# Patient Record
Sex: MALE | Race: WHITE | URBAN - METROPOLITAN AREA
[De-identification: names, ages, dates, MRNs, and addresses within clinical notes are randomized per-mention and may not be internally consistent; named-entity substitution may affect disease eponyms.]

---

## 2018-08-15 ENCOUNTER — OFFICE VISIT (OUTPATIENT)
Dept: FAMILY MEDICINE | Facility: CLINIC | Age: 38
End: 2018-08-15
Payer: COMMERCIAL

## 2018-08-15 VITALS
WEIGHT: 193.8 LBS | DIASTOLIC BLOOD PRESSURE: 84 MMHG | SYSTOLIC BLOOD PRESSURE: 132 MMHG | BODY MASS INDEX: 27.13 KG/M2 | RESPIRATION RATE: 16 BRPM | HEART RATE: 74 BPM | TEMPERATURE: 98.6 F | HEIGHT: 71 IN

## 2018-08-15 DIAGNOSIS — H61.21 IMPACTED CERUMEN OF RIGHT EAR: Primary | ICD-10-CM

## 2018-08-15 PROCEDURE — 69209 REMOVE IMPACTED EAR WAX UNI: CPT | Mod: RT | Performed by: FAMILY MEDICINE

## 2018-08-15 ASSESSMENT — ANXIETY QUESTIONNAIRES
7. FEELING AFRAID AS IF SOMETHING AWFUL MIGHT HAPPEN: NOT AT ALL
6. BECOMING EASILY ANNOYED OR IRRITABLE: NOT AT ALL
GAD7 TOTAL SCORE: 0
5. BEING SO RESTLESS THAT IT IS HARD TO SIT STILL: NOT AT ALL
1. FEELING NERVOUS, ANXIOUS, OR ON EDGE: NOT AT ALL
2. NOT BEING ABLE TO STOP OR CONTROL WORRYING: NOT AT ALL
3. WORRYING TOO MUCH ABOUT DIFFERENT THINGS: NOT AT ALL
IF YOU CHECKED OFF ANY PROBLEMS ON THIS QUESTIONNAIRE, HOW DIFFICULT HAVE THESE PROBLEMS MADE IT FOR YOU TO DO YOUR WORK, TAKE CARE OF THINGS AT HOME, OR GET ALONG WITH OTHER PEOPLE: NOT DIFFICULT AT ALL

## 2018-08-15 ASSESSMENT — PATIENT HEALTH QUESTIONNAIRE - PHQ9: 5. POOR APPETITE OR OVEREATING: NOT AT ALL

## 2018-08-15 NOTE — MR AVS SNAPSHOT
"              After Visit Summary   8/15/2018    Joel Whitifeld    MRN: [id number]           Patient Information     Date Of Birth          1980        Visit Information        Provider Department      8/15/2018 1:20 PM Page Reynoso MD [facility]        Today's Diagnoses     Impacted cerumen of right ear    -  1       Follow-ups after your visit        Who to contact     If you have questions or need follow up information about today's clinic visit or your schedule please contact [facility] directly at [phone].  Normal or non-critical lab and imaging results will be communicated to you by MyChart, letter or phone within 4 business days after the clinic has received the results. If you do not hear from us within 7 days, please contact the clinic through MyChart or phone. If you have a critical or abnormal lab result, we will notify you by phone as soon as possible.  Submit refill requests through [employer] or call your pharmacy and they will forward the refill request to us. Please allow 3 business days for your refill to be completed.          Additional Information About Your Visit        Care EveryWhere ID     This is your Care EveryWhere ID. This could be used by other organizations to access your [facility] medical records  [id number]        Your Vitals Were     Pulse Temperature Respirations Height BMI (Body Mass Index)       74 98.6  F (37  C) (Tympanic) 16 5' 11.25\" (1.81 m) 26.84 kg/m2        Blood Pressure from Last 3 Encounters:   08/15/18 132/84   07/31/08 114/76   04/10/08 124/68    Weight from Last 3 Encounters:   08/15/18 193 lb 12.8 oz (87.9 kg)   07/31/08 188 lb (85.3 kg)   01/24/08 187 lb (84.8 kg)              Today, you had the following     No orders found for display       Primary Care Provider Office Phone # Fax #    Nilesh Finney [phone] [address] [facility] [address]        Equal Access to Services     " YULISSA ABAD : Hadii aad laine anthony Estrada, wadadada luqadaha, qaybta kaalmada josebuckbrian, waxamy mj videswillirebecca johns. So [facility] [phone].    ATENCIÓN: Si habla español, tiene a pacheco disposición servicios gratuitos de asistencia lingüística. Llame al [phone].    We comply with applicable federal civil rights laws and [place] laws. We do not discriminate on the basis of race, color, national origin, age, disability, sex, sexual orientation, or gender identity.            Thank you!     Thank you for choosing [facility]  for your care. Our goal is always to provide you with excellent care. Hearing back from our patients is one way we can continue to improve our services. Please take a few minutes to complete the written survey that you may receive in the mail after your visit with us. Thank you!             Your Updated Medication List - Protect others around you: Learn how to safely use, store and throw away your medicines at www.disposemymeds.org.          This list is accurate as of 8/15/18  1:52 PM.  Always use your most recent med list.                   Brand Name Dispense Instructions for use Diagnosis    NO ACTIVE MEDICATIONS      .    Unspecified sinusitis (chronic)

## 2018-08-15 NOTE — PROGRESS NOTES
"   SUBJECTIVE:   Joel Whitfield is a 38 year old male who presents to clinic today for the following health issues:    Chief Complaint   Patient presents with     New Patient     Plugged Ears     Right ear. No pain. Patient went swimming in a pool 5 days ago.     SUBJECTIVE:  Joel Whitfield, a 38 year old male scheduled an appointment to discuss the following issues:  Impacted cerumen of right ear    4 days of plugged right ear without pain.  Started after swimming.     Medical, social, surgical, and family histories reviewed.  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)    Social History   Substance Use Topics     Smoking status: Never Smoker     Smokeless tobacco: Current User     Types: Chew     Alcohol use Yes         OBJECTIVE:  /84  Pulse 74  Temp 98.6  F (37  C) (Tympanic)  Resp 16  Ht 5' 11.25\" (1.81 m)  Wt 193 lb 12.8 oz (87.9 kg)  BMI 26.84 kg/m2  EXAM:  GENERAL APPEARANCE ADULT: Alert, no acute distress  HENT: right TM not visualized secondary to cerumen, left TM normal, ear canal: right ear:cerumen removed by irrigation, canal shows mild erythema after removal of wax    ASSESSMENT/PLAN:    (H61.21) Impacted cerumen of right ear  (primary encounter diagnosis)  Comment: cerumen removed by flushing  Plan: f/u prn. Canal is mildly erythematous, likely just irritated from the cerumen and flushing.  If painful over the next few days, can call and I'll order some drops.                  "

## 2018-08-16 ASSESSMENT — ANXIETY QUESTIONNAIRES: GAD7 TOTAL SCORE: 0

## 2018-08-16 ASSESSMENT — PATIENT HEALTH QUESTIONNAIRE - PHQ9: SUM OF ALL RESPONSES TO PHQ QUESTIONS 1-9: 0
